# Patient Record
Sex: FEMALE | Race: BLACK OR AFRICAN AMERICAN | NOT HISPANIC OR LATINO | URBAN - METROPOLITAN AREA
[De-identification: names, ages, dates, MRNs, and addresses within clinical notes are randomized per-mention and may not be internally consistent; named-entity substitution may affect disease eponyms.]

---

## 2019-03-23 ENCOUNTER — EMERGENCY (EMERGENCY)
Facility: HOSPITAL | Age: 39
LOS: 1 days | Discharge: HOME | End: 2019-03-23
Attending: EMERGENCY MEDICINE | Admitting: EMERGENCY MEDICINE
Payer: MEDICAID

## 2019-03-23 VITALS
HEIGHT: 66 IN | TEMPERATURE: 98 F | HEART RATE: 72 BPM | WEIGHT: 214.95 LBS | DIASTOLIC BLOOD PRESSURE: 69 MMHG | RESPIRATION RATE: 15 BRPM | OXYGEN SATURATION: 97 % | SYSTOLIC BLOOD PRESSURE: 156 MMHG

## 2019-03-23 VITALS
RESPIRATION RATE: 15 BRPM | SYSTOLIC BLOOD PRESSURE: 132 MMHG | HEART RATE: 70 BPM | OXYGEN SATURATION: 99 % | DIASTOLIC BLOOD PRESSURE: 80 MMHG

## 2019-03-23 LAB
ALBUMIN SERPL ELPH-MCNC: 3.9 G/DL — SIGNIFICANT CHANGE UP (ref 3.3–5)
ALP SERPL-CCNC: 59 U/L — SIGNIFICANT CHANGE UP (ref 30–120)
ALT FLD-CCNC: 125 U/L DA — HIGH (ref 10–60)
ANION GAP SERPL CALC-SCNC: 10 MMOL/L — SIGNIFICANT CHANGE UP (ref 5–17)
AST SERPL-CCNC: 104 U/L — HIGH (ref 10–40)
BASOPHILS # BLD AUTO: 0.02 K/UL — SIGNIFICANT CHANGE UP (ref 0–0.2)
BASOPHILS NFR BLD AUTO: 0.5 % — SIGNIFICANT CHANGE UP (ref 0–2)
BILIRUB SERPL-MCNC: 0.6 MG/DL — SIGNIFICANT CHANGE UP (ref 0.2–1.2)
BUN SERPL-MCNC: 6 MG/DL — LOW (ref 7–23)
CALCIUM SERPL-MCNC: 8.7 MG/DL — SIGNIFICANT CHANGE UP (ref 8.4–10.5)
CHLORIDE SERPL-SCNC: 103 MMOL/L — SIGNIFICANT CHANGE UP (ref 96–108)
CK SERPL-CCNC: 145 U/L — SIGNIFICANT CHANGE UP (ref 26–192)
CO2 SERPL-SCNC: 28 MMOL/L — SIGNIFICANT CHANGE UP (ref 22–31)
CREAT SERPL-MCNC: 0.66 MG/DL — SIGNIFICANT CHANGE UP (ref 0.5–1.3)
EOSINOPHIL # BLD AUTO: 0.06 K/UL — SIGNIFICANT CHANGE UP (ref 0–0.5)
EOSINOPHIL NFR BLD AUTO: 1.6 % — SIGNIFICANT CHANGE UP (ref 0–6)
GLUCOSE SERPL-MCNC: 92 MG/DL — SIGNIFICANT CHANGE UP (ref 70–99)
HCT VFR BLD CALC: 38.4 % — SIGNIFICANT CHANGE UP (ref 34.5–45)
HGB BLD-MCNC: 13.5 G/DL — SIGNIFICANT CHANGE UP (ref 11.5–15.5)
IMM GRANULOCYTES NFR BLD AUTO: 0.3 % — SIGNIFICANT CHANGE UP (ref 0–1.5)
LYMPHOCYTES # BLD AUTO: 1.05 K/UL — SIGNIFICANT CHANGE UP (ref 1–3.3)
LYMPHOCYTES # BLD AUTO: 28.3 % — SIGNIFICANT CHANGE UP (ref 13–44)
MAGNESIUM SERPL-MCNC: 3.7 MG/DL — HIGH (ref 1.6–2.6)
MCHC RBC-ENTMCNC: 35.2 GM/DL — SIGNIFICANT CHANGE UP (ref 32–36)
MCHC RBC-ENTMCNC: 35.4 PG — HIGH (ref 27–34)
MCV RBC AUTO: 100.8 FL — HIGH (ref 80–100)
MONOCYTES # BLD AUTO: 0.25 K/UL — SIGNIFICANT CHANGE UP (ref 0–0.9)
MONOCYTES NFR BLD AUTO: 6.7 % — SIGNIFICANT CHANGE UP (ref 2–14)
NEUTROPHILS # BLD AUTO: 2.32 K/UL — SIGNIFICANT CHANGE UP (ref 1.8–7.4)
NEUTROPHILS NFR BLD AUTO: 62.6 % — SIGNIFICANT CHANGE UP (ref 43–77)
NRBC # BLD: 0 /100 WBCS — SIGNIFICANT CHANGE UP (ref 0–0)
PHOSPHATE SERPL-MCNC: 3.1 MG/DL — SIGNIFICANT CHANGE UP (ref 2.5–4.5)
PLATELET # BLD AUTO: 207 K/UL — SIGNIFICANT CHANGE UP (ref 150–400)
POTASSIUM SERPL-MCNC: 3 MMOL/L — LOW (ref 3.5–5.3)
POTASSIUM SERPL-SCNC: 3 MMOL/L — LOW (ref 3.5–5.3)
PROT SERPL-MCNC: 7.6 G/DL — SIGNIFICANT CHANGE UP (ref 6–8.3)
RBC # BLD: 3.81 M/UL — SIGNIFICANT CHANGE UP (ref 3.8–5.2)
RBC # FLD: 12.3 % — SIGNIFICANT CHANGE UP (ref 10.3–14.5)
SODIUM SERPL-SCNC: 141 MMOL/L — SIGNIFICANT CHANGE UP (ref 135–145)
TSH SERPL-MCNC: 1.01 UIU/ML — SIGNIFICANT CHANGE UP (ref 0.27–4.2)
WBC # BLD: 3.71 K/UL — LOW (ref 3.8–10.5)
WBC # FLD AUTO: 3.71 K/UL — LOW (ref 3.8–10.5)

## 2019-03-23 PROCEDURE — 99283 EMERGENCY DEPT VISIT LOW MDM: CPT

## 2019-03-23 PROCEDURE — 84100 ASSAY OF PHOSPHORUS: CPT

## 2019-03-23 PROCEDURE — 84443 ASSAY THYROID STIM HORMONE: CPT

## 2019-03-23 PROCEDURE — 82550 ASSAY OF CK (CPK): CPT

## 2019-03-23 PROCEDURE — 80053 COMPREHEN METABOLIC PANEL: CPT

## 2019-03-23 PROCEDURE — 36415 COLL VENOUS BLD VENIPUNCTURE: CPT

## 2019-03-23 PROCEDURE — 85027 COMPLETE CBC AUTOMATED: CPT

## 2019-03-23 PROCEDURE — 83735 ASSAY OF MAGNESIUM: CPT

## 2019-03-23 RX ORDER — SODIUM CHLORIDE 9 MG/ML
3 INJECTION INTRAMUSCULAR; INTRAVENOUS; SUBCUTANEOUS ONCE
Qty: 0 | Refills: 0 | Status: COMPLETED | OUTPATIENT
Start: 2019-03-23 | End: 2019-03-23

## 2019-03-23 RX ORDER — POTASSIUM CHLORIDE 20 MEQ
40 PACKET (EA) ORAL ONCE
Qty: 0 | Refills: 0 | Status: COMPLETED | OUTPATIENT
Start: 2019-03-23 | End: 2019-03-23

## 2019-03-23 RX ORDER — SODIUM CHLORIDE 9 MG/ML
1000 INJECTION INTRAMUSCULAR; INTRAVENOUS; SUBCUTANEOUS ONCE
Qty: 0 | Refills: 0 | Status: COMPLETED | OUTPATIENT
Start: 2019-03-23 | End: 2019-03-23

## 2019-03-23 RX ADMIN — SODIUM CHLORIDE 3 MILLILITER(S): 9 INJECTION INTRAMUSCULAR; INTRAVENOUS; SUBCUTANEOUS at 19:10

## 2019-03-23 RX ADMIN — Medication 40 MILLIEQUIVALENT(S): at 19:10

## 2019-03-23 RX ADMIN — SODIUM CHLORIDE 1000 MILLILITER(S): 9 INJECTION INTRAMUSCULAR; INTRAVENOUS; SUBCUTANEOUS at 19:40

## 2019-03-23 RX ADMIN — SODIUM CHLORIDE 1000 MILLILITER(S): 9 INJECTION INTRAMUSCULAR; INTRAVENOUS; SUBCUTANEOUS at 19:11

## 2019-03-23 NOTE — ED PROVIDER NOTE - PHYSICAL EXAMINATION
Neuro- A&Ox3. Speech clear, without articulation or word-finding difficulties. Eyes- PERRL bilaterally. EOMs in tact. No nystagmus. CN II-XII in tact. No facial droop. No sensory or motor deficits throughout extremities bilaterally. Strength 5/5 throughout upper and lower extremities. No pronator drift. Gait stable.   Spine Exam:     Cervical: No erythema, ecchymosis, or visible deformity. No midline tenderness or step-off appreciated on palpation. No paravertebral tenderness. No muscle spasm. FROM. NEG NEXUS criteria.          Thoracic: No erythema, ecchymosis, or visible deformity. No midline tenderness or step-off appreciated on palpation. No paravertebral tenderness. No muscle spasm.           Lumbosacral: No erythema, ecchymosis, or visible deformity. No midline tenderness or step-off appreciated on palpation. No paravertebral tenderness. No muscle spasm.

## 2019-03-23 NOTE — ED PROVIDER NOTE - OBJECTIVE STATEMENT
pt is a 38yo female with no significant pmhx c/o tingling in hands x today.pt reports she had episode of spasm In both hands with tingling from elbows to fingertips that lasted for 20 minutes. pt reports shortly after symptoms started she had dizziness that quickly resolved. pt is a 38yo female with no significant pmhx c/o tingling in hands x today.pt reports she had episode of spasm In both hands with tingling from elbows to fingertips that lasted for 20 minutes. pt reports shortly after symptoms started she had dizziness that quickly resolved. pt reports similar symptoms within the past few months. symptoms occur 2 times monthly, sometimes in hands sometimes in her head. pt reports she took a bath which improved symptoms. pt denies cp, sob, n/v, vision changes, loc.
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
## 2019-03-23 NOTE — ED ADULT NURSE NOTE - OBJECTIVE STATEMENT
patient has chronic numbness and right side of facial numbness, Pt is in no acute distress. pending md's eval.

## 2019-03-23 NOTE — ED PROVIDER NOTE - ATTENDING CONTRIBUTION TO CARE
I, Dr Granado, have personally performed a face to face diagnostic evaluation on this patient with the PA/NP. I have reviewed the PA/NP's note and agree with the history, Physical exam and plan of care, As per history pt is a 38yo female with no significant pmhx c/o tingling in hands x today.pt reports she had episode of spasm In both hands with tingling from elbows to fingertips that lasted for 20 minutes. pt reports shortly after symptoms started she had dizziness that quickly resolved. pt reports similar symptoms within the past few months. symptoms occur 2 times monthly, sometimes in hands sometimes in her head. pt reports she took a bath which improved symptoms. pt denies cp, sob, n/v, vision changes, loc. Lab study noted PE Unremarkable patient discharge home to fallow up with PMD.

## 2019-03-23 NOTE — ED PROVIDER NOTE - CARE PROVIDER_API CALL
Bob Burgos)  Neurology  [address] [place] [id number]  Phone: [phone]  Fax: [phone]  Follow Up Time: 1-3 Days

## 2019-03-23 NOTE — ED PROVIDER NOTE - PROGRESS NOTE DETAILS
pt improved. all labs reviewed. pt given po potassium. pt advised to follow up with pmd and neuro. All imaging and labs reviewed. all results reviewed with pt including abnormal results. pt given a copy of results. pt advised to follow up with pmd regarding abnormal results. All questions answered and concerns addressed. pt verbalized understanding and agreement with plan and dx. pt advised on next step and when/where to follow up. pt advised on all take home and otc medications. pt advised to follow up with PMD. pt advised to return to ed for worsenng symptoms including fever, cp, sob. will dc. pt improved. all labs reviewed. pt given po potassium. pt drinks etoh advised to refrain while left are elevated and follow up with pmd to repeat/eval.  pt advised to follow up with pmd and neuro. All imaging and labs reviewed. all results reviewed with pt including abnormal results. pt given a copy of results. pt advised to follow up with pmd regarding abnormal results. All questions answered and concerns addressed. pt verbalized understanding and agreement with plan and dx. pt advised on next step and when/where to follow up. pt advised on all take home and otc medications. pt advised to follow up with PMD. pt advised to return to ed for worsenng symptoms including fever, cp, sob. will dc.

## 2019-03-23 NOTE — ED PROVIDER NOTE - CHPI ED SYMPTOMS NEG
no blurred vision/no fever/no confusion/no numbness/no vomiting/no loss of consciousness/no nausea/no weakness/no change in level of consciousness

## 2019-06-11 ENCOUNTER — EMERGENCY (EMERGENCY)
Facility: HOSPITAL | Age: 39
LOS: 1 days | Discharge: HOME | End: 2019-06-11
Attending: EMERGENCY MEDICINE | Admitting: EMERGENCY MEDICINE
Payer: MEDICAID

## 2019-06-11 VITALS
DIASTOLIC BLOOD PRESSURE: 89 MMHG | HEART RATE: 68 BPM | SYSTOLIC BLOOD PRESSURE: 141 MMHG | OXYGEN SATURATION: 100 % | TEMPERATURE: 99 F | RESPIRATION RATE: 16 BRPM

## 2019-06-11 VITALS
SYSTOLIC BLOOD PRESSURE: 150 MMHG | RESPIRATION RATE: 17 BRPM | DIASTOLIC BLOOD PRESSURE: 98 MMHG | OXYGEN SATURATION: 98 % | HEART RATE: 68 BPM

## 2019-06-11 PROCEDURE — 99284 EMERGENCY DEPT VISIT MOD MDM: CPT

## 2019-06-11 PROCEDURE — 70486 CT MAXILLOFACIAL W/O DYE: CPT | Mod: 26

## 2019-06-11 PROCEDURE — 70450 CT HEAD/BRAIN W/O DYE: CPT | Mod: 26

## 2019-06-11 RX ORDER — ACETAMINOPHEN 500 MG
975 TABLET ORAL ONCE
Refills: 0 | Status: COMPLETED | OUTPATIENT
Start: 2019-06-11 | End: 2019-06-11

## 2019-06-11 RX ORDER — IBUPROFEN 200 MG
800 TABLET ORAL ONCE
Refills: 0 | Status: DISCONTINUED | OUTPATIENT
Start: 2019-06-11 | End: 2019-06-15

## 2019-06-11 RX ORDER — TRANEXAMIC ACID 100 MG/ML
5 INJECTION, SOLUTION INTRAVENOUS ONCE
Refills: 0 | Status: COMPLETED | OUTPATIENT
Start: 2019-06-11 | End: 2019-06-11

## 2019-06-11 RX ADMIN — TRANEXAMIC ACID 5 MILLILITER(S): 100 INJECTION, SOLUTION INTRAVENOUS at 14:28

## 2019-06-11 RX ADMIN — Medication 975 MILLIGRAM(S): at 14:27
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
## 2019-06-11 NOTE — ED PROVIDER NOTE - NSFOLLOWUPINSTRUCTIONS_ED_ALL_ED_FT
Follow up with the dental clinic within one week. Keep your mouth injury clean, avoid rubbing the sutures (stitches) to keep them from opening prematurely. Take Motrin 600-800mg every 6 hours as needed for pain and/or Swelling for the next 1-3 days. Take Tylenol 650mg every 4 hours as needed for pain during this time as well. You may apply ice to the affected area for 10 minutes at a time as tolerated.     Return to the ER promptly for any urgent concerns. \    [facility]:  ([phone]).
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
## 2019-06-11 NOTE — PROVIDER CONTACT NOTE (OTHER) - RECOMMENDATIONS
[facility] caller ID# [id number], call time 15:51pm, spoke with Sarahi GABRIEL at [facility]

## 2019-06-11 NOTE — ED PROVIDER NOTE - NS ED ROS FT
CONSTITUTIONAL: No fevers, no chills  Eyes: no visual changes  Ears: no ear drainage, no ear pain  Nose: no nasal congestion  Mouth/Throat: lip laceration   Cardiovascular: No Chest pain  Respiratory: No SOB  Gastrointestinal: No n/v/d, no abd pain  Genitourinary: no dysuria, no hematuria  SKIN: no rashes.  NEURO: no headache  PSYCHIATRIC: no known mental health issues.

## 2019-06-11 NOTE — ED PROVIDER NOTE - PHYSICAL EXAMINATION
GEN - NAD; well appearing; A+O x3   HEAD - nc/at  EYES - EOMI, no conjunctival pallor, no scleral icterus  ENT -   upper lip with oozing abrasion/lacation, r. central incisor TTP, significant lip swelling      NECK - Neck supple  PULM - CTA b/l,  symmetric breath sounds  COR -  RRR, S1 S2, no murmurs  ABD - , ND, NT, soft, no guarding, no rebound, no masses    BACK - no CVA tenderness, nontender spine     EXTREMS - no edema, no deformity, warm and well perfused    SKIN - no rash or bruising      NEUROLOGIC - alert, sensation nl, motor 5/5 RUE/LUE/RLE/LLE
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
## 2019-06-11 NOTE — ED PROVIDER NOTE - NSFOLLOWUPCLINICS_GEN_ALL_ED_FT
[facility]  Dental  [address] [place] [id number]  Phone: [phone]  Fax:   Follow Up Time:
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
## 2019-12-20 ENCOUNTER — EMERGENCY (EMERGENCY)
Facility: HOSPITAL | Age: 39
LOS: 1 days | Discharge: HOME | End: 2019-12-20
Attending: EMERGENCY MEDICINE | Admitting: EMERGENCY MEDICINE
Payer: MEDICAID

## 2019-12-20 VITALS
DIASTOLIC BLOOD PRESSURE: 104 MMHG | SYSTOLIC BLOOD PRESSURE: 158 MMHG | TEMPERATURE: 99 F | RESPIRATION RATE: 17 BRPM | OXYGEN SATURATION: 97 % | HEART RATE: 93 BPM

## 2019-12-20 PROCEDURE — 99285 EMERGENCY DEPT VISIT HI MDM: CPT

## 2019-12-20 RX ORDER — KETOROLAC TROMETHAMINE 30 MG/ML
15 SYRINGE (ML) INJECTION ONCE
Refills: 0 | Status: DISCONTINUED | OUTPATIENT
Start: 2019-12-20 | End: 2019-12-20

## 2019-12-20 RX ORDER — MORPHINE SULFATE 50 MG/1
6 CAPSULE, EXTENDED RELEASE ORAL ONCE
Refills: 0 | Status: DISCONTINUED | OUTPATIENT
Start: 2019-12-20 | End: 2019-12-20

## 2019-12-20 RX ORDER — MORPHINE SULFATE 50 MG/1
4 CAPSULE, EXTENDED RELEASE ORAL ONCE
Refills: 0 | Status: DISCONTINUED | OUTPATIENT
Start: 2019-12-20 | End: 2019-12-20

## 2019-12-20 RX ORDER — PENICILLIN V POTASSIUM 250 MG
500 TABLET ORAL ONCE
Refills: 0 | Status: COMPLETED | OUTPATIENT
Start: 2019-12-20 | End: 2019-12-20

## 2019-12-20 RX ORDER — PENICILLIN V POTASSIUM 250 MG
1 TABLET ORAL
Qty: 29 | Refills: 0
Start: 2019-12-20

## 2019-12-20 RX ADMIN — MORPHINE SULFATE 4 MILLIGRAM(S): 50 CAPSULE, EXTENDED RELEASE ORAL at 09:07

## 2019-12-20 RX ADMIN — MORPHINE SULFATE 4 MILLIGRAM(S): 50 CAPSULE, EXTENDED RELEASE ORAL at 08:52

## 2019-12-20 RX ADMIN — Medication 15 MILLIGRAM(S): at 10:03

## 2019-12-20 RX ADMIN — Medication 500 MILLIGRAM(S): at 08:52

## 2019-12-20 NOTE — ED PROVIDER NOTE - PATIENT PORTAL LINK FT
You can access the FollowMyHealth Patient Portal offered by [facility] by registering at the following website: [email or web address]. By joining [employer]’s FollowMyHealth portal, you will also be able to view your health information using other applications (apps) compatible with our system.

## 2019-12-20 NOTE — ED ADULT NURSE NOTE - NSIMPLEMENTINTERV_GEN_ALL_ED
Patient has not been feeling well for 3 days. Their symptoms are rash on groin.   Anita Francis LPN LPN....................  9/29/2019   2:53 PM      
Implemented All Universal Safety Interventions:  [place] to call system. Call bell, personal items and telephone within reach. Instruct patient to call for assistance. Room bathroom lighting operational. Non-slip footwear when patient is off stretcher. Physically safe environment: no spills, clutter or unnecessary equipment. Stretcher in lowest position, wheels locked, appropriate side rails in place.

## 2019-12-20 NOTE — ED PROVIDER NOTE - OBJECTIVE STATEMENT
38 y/o F presents to ED c/o left upper dental pain. Pt notes the pain has been there for years, and over the last 2 days it got worse. Pt has pain when eating. Pt is aware of cracked molar on top, but hasn't gotten it fixed yet. Pt is tolerating PO, and fluids. Pt denies fever.      PMH: Bronchitis    Heart murmur    UTI (urinary tract infection)    PSH:  delivery delivered    FH/SH: non-contributory, except as noted in the HPI  Allergies: No known drug allergies  Immunizations: Up-to-date  Medications: No chronic home medications

## 2019-12-20 NOTE — ED ADULT TRIAGE NOTE - CHIEF COMPLAINT QUOTE
alert c/o left side dental pain  states wisdom tooth is cracked  has been taking motrin but its no longer working

## 2019-12-30 ENCOUNTER — EMERGENCY (EMERGENCY)
Facility: HOSPITAL | Age: 39
LOS: 1 days | Discharge: LEFT BEFORE DISCHARGE | End: 2019-12-30
Admitting: EMERGENCY MEDICINE

## 2019-12-30 VITALS
OXYGEN SATURATION: 100 % | HEART RATE: 74 BPM | RESPIRATION RATE: 18 BRPM | DIASTOLIC BLOOD PRESSURE: 92 MMHG | SYSTOLIC BLOOD PRESSURE: 142 MMHG | TEMPERATURE: 98 F

## 2019-12-30 NOTE — ED ADULT NURSE NOTE - CHIEF COMPLAINT QUOTE
Pt. c/o "inflamed wisdom tooth" on left upper side, seen here last week, prescribed codeine and penicillin c/o persistent pain to area. Reports taking 400 mg of motrin around 8 pm. Denies fever, chills.

## 2020-10-21 ENCOUNTER — EMERGENCY (EMERGENCY)
Facility: HOSPITAL | Age: 40
LOS: 1 days | Discharge: HOME | End: 2020-10-21
Attending: EMERGENCY MEDICINE | Admitting: EMERGENCY MEDICINE
Payer: MEDICAID

## 2020-10-21 VITALS
HEIGHT: 66 IN | RESPIRATION RATE: 18 BRPM | HEART RATE: 88 BPM | TEMPERATURE: 98 F | SYSTOLIC BLOOD PRESSURE: 153 MMHG | OXYGEN SATURATION: 100 % | DIASTOLIC BLOOD PRESSURE: 102 MMHG

## 2020-10-21 VITALS
RESPIRATION RATE: 16 BRPM | HEART RATE: 84 BPM | OXYGEN SATURATION: 100 % | DIASTOLIC BLOOD PRESSURE: 93 MMHG | SYSTOLIC BLOOD PRESSURE: 144 MMHG

## 2020-10-21 LAB
ALBUMIN SERPL ELPH-MCNC: 4.8 G/DL — SIGNIFICANT CHANGE UP (ref 3.3–5)
ALP SERPL-CCNC: 62 U/L — SIGNIFICANT CHANGE UP (ref 40–120)
ALT FLD-CCNC: 158 U/L — HIGH (ref 4–33)
ANION GAP SERPL CALC-SCNC: 17 MMO/L — HIGH (ref 7–14)
ANISOCYTOSIS BLD QL: SLIGHT — SIGNIFICANT CHANGE UP
APPEARANCE UR: CLEAR — SIGNIFICANT CHANGE UP
AST SERPL-CCNC: 209 U/L — HIGH (ref 4–32)
BACTERIA # UR AUTO: NEGATIVE — SIGNIFICANT CHANGE UP
BASOPHILS # BLD AUTO: 0.01 K/UL — SIGNIFICANT CHANGE UP (ref 0–0.2)
BASOPHILS NFR BLD AUTO: 0.2 % — SIGNIFICANT CHANGE UP (ref 0–2)
BASOPHILS NFR SPEC: 0 % — SIGNIFICANT CHANGE UP (ref 0–2)
BILIRUB SERPL-MCNC: 1 MG/DL — SIGNIFICANT CHANGE UP (ref 0.2–1.2)
BILIRUB UR-MCNC: NEGATIVE — SIGNIFICANT CHANGE UP
BLOOD UR QL VISUAL: NEGATIVE — SIGNIFICANT CHANGE UP
BUN SERPL-MCNC: 6 MG/DL — LOW (ref 7–23)
CALCIUM SERPL-MCNC: 8.9 MG/DL — SIGNIFICANT CHANGE UP (ref 8.4–10.5)
CHLORIDE SERPL-SCNC: 101 MMOL/L — SIGNIFICANT CHANGE UP (ref 98–107)
CO2 SERPL-SCNC: 25 MMOL/L — SIGNIFICANT CHANGE UP (ref 22–31)
COLOR SPEC: YELLOW — SIGNIFICANT CHANGE UP
CREAT SERPL-MCNC: 0.53 MG/DL — SIGNIFICANT CHANGE UP (ref 0.5–1.3)
EOSINOPHIL # BLD AUTO: 0.02 K/UL — SIGNIFICANT CHANGE UP (ref 0–0.5)
EOSINOPHIL NFR BLD AUTO: 0.4 % — SIGNIFICANT CHANGE UP (ref 0–6)
EOSINOPHIL NFR FLD: 0 % — SIGNIFICANT CHANGE UP (ref 0–6)
GLUCOSE SERPL-MCNC: 88 MG/DL — SIGNIFICANT CHANGE UP (ref 70–99)
GLUCOSE UR-MCNC: NEGATIVE — SIGNIFICANT CHANGE UP
HCT VFR BLD CALC: 40 % — SIGNIFICANT CHANGE UP (ref 34.5–45)
HGB BLD-MCNC: 13.4 G/DL — SIGNIFICANT CHANGE UP (ref 11.5–15.5)
HYALINE CASTS # UR AUTO: SIGNIFICANT CHANGE UP
IMM GRANULOCYTES NFR BLD AUTO: 0.2 % — SIGNIFICANT CHANGE UP (ref 0–1.5)
KETONES UR-MCNC: HIGH
LEUKOCYTE ESTERASE UR-ACNC: NEGATIVE — SIGNIFICANT CHANGE UP
LIDOCAIN IGE QN: 41.2 U/L — SIGNIFICANT CHANGE UP (ref 7–60)
LYMPHOCYTES # BLD AUTO: 0.89 K/UL — LOW (ref 1–3.3)
LYMPHOCYTES # BLD AUTO: 18.7 % — SIGNIFICANT CHANGE UP (ref 13–44)
LYMPHOCYTES NFR SPEC AUTO: 19.5 % — SIGNIFICANT CHANGE UP (ref 13–44)
MACROCYTES BLD QL: SLIGHT — SIGNIFICANT CHANGE UP
MAGNESIUM SERPL-MCNC: 1.7 MG/DL — SIGNIFICANT CHANGE UP (ref 1.6–2.6)
MCHC RBC-ENTMCNC: 33.5 % — SIGNIFICANT CHANGE UP (ref 32–36)
MCHC RBC-ENTMCNC: 35.3 PG — HIGH (ref 27–34)
MCV RBC AUTO: 105.3 FL — HIGH (ref 80–100)
MONOCYTES # BLD AUTO: 0.23 K/UL — SIGNIFICANT CHANGE UP (ref 0–0.9)
MONOCYTES NFR BLD AUTO: 4.8 % — SIGNIFICANT CHANGE UP (ref 2–14)
MONOCYTES NFR BLD: 3.4 % — SIGNIFICANT CHANGE UP (ref 2–9)
NEUTROPHIL AB SER-ACNC: 74.6 % — SIGNIFICANT CHANGE UP (ref 43–77)
NEUTROPHILS # BLD AUTO: 3.59 K/UL — SIGNIFICANT CHANGE UP (ref 1.8–7.4)
NEUTROPHILS NFR BLD AUTO: 75.7 % — SIGNIFICANT CHANGE UP (ref 43–77)
NITRITE UR-MCNC: NEGATIVE — SIGNIFICANT CHANGE UP
NRBC # FLD: 0 K/UL — SIGNIFICANT CHANGE UP (ref 0–0)
PH UR: 7.5 — SIGNIFICANT CHANGE UP (ref 5–8)
PHOSPHATE SERPL-MCNC: 2.6 MG/DL — SIGNIFICANT CHANGE UP (ref 2.5–4.5)
PLATELET # BLD AUTO: 194 K/UL — SIGNIFICANT CHANGE UP (ref 150–400)
PLATELET COUNT - ESTIMATE: NORMAL — SIGNIFICANT CHANGE UP
PMV BLD: 9.8 FL — SIGNIFICANT CHANGE UP (ref 7–13)
POLYCHROMASIA BLD QL SMEAR: SLIGHT — SIGNIFICANT CHANGE UP
POTASSIUM SERPL-MCNC: 3.2 MMOL/L — LOW (ref 3.5–5.3)
POTASSIUM SERPL-SCNC: 3.2 MMOL/L — LOW (ref 3.5–5.3)
PROT SERPL-MCNC: 7.8 G/DL — SIGNIFICANT CHANGE UP (ref 6–8.3)
PROT UR-MCNC: 30 — SIGNIFICANT CHANGE UP
RBC # BLD: 3.8 M/UL — SIGNIFICANT CHANGE UP (ref 3.8–5.2)
RBC # FLD: 12.7 % — SIGNIFICANT CHANGE UP (ref 10.3–14.5)
RBC CASTS # UR COMP ASSIST: SIGNIFICANT CHANGE UP (ref 0–?)
SODIUM SERPL-SCNC: 143 MMOL/L — SIGNIFICANT CHANGE UP (ref 135–145)
SP GR SPEC: 1.03 — SIGNIFICANT CHANGE UP (ref 1–1.04)
SQUAMOUS # UR AUTO: SIGNIFICANT CHANGE UP
UROBILINOGEN FLD QL: SIGNIFICANT CHANGE UP
VARIANT LYMPHS # BLD: 2.5 % — SIGNIFICANT CHANGE UP
WBC # BLD: 4.75 K/UL — SIGNIFICANT CHANGE UP (ref 3.8–10.5)
WBC # FLD AUTO: 4.75 K/UL — SIGNIFICANT CHANGE UP (ref 3.8–10.5)
WBC UR QL: SIGNIFICANT CHANGE UP (ref 0–?)

## 2020-10-21 PROCEDURE — 99285 EMERGENCY DEPT VISIT HI MDM: CPT

## 2020-10-21 RX ORDER — POTASSIUM CHLORIDE 20 MEQ
40 PACKET (EA) ORAL ONCE
Refills: 0 | Status: COMPLETED | OUTPATIENT
Start: 2020-10-21 | End: 2020-10-21

## 2020-10-21 RX ORDER — ONDANSETRON 8 MG/1
4 TABLET, FILM COATED ORAL ONCE
Refills: 0 | Status: COMPLETED | OUTPATIENT
Start: 2020-10-21 | End: 2020-10-21

## 2020-10-21 RX ORDER — ONDANSETRON 8 MG/1
1 TABLET, FILM COATED ORAL
Qty: 9 | Refills: 0
Start: 2020-10-21

## 2020-10-21 RX ORDER — FAMOTIDINE 10 MG/ML
20 INJECTION INTRAVENOUS ONCE
Refills: 0 | Status: COMPLETED | OUTPATIENT
Start: 2020-10-21 | End: 2020-10-21

## 2020-10-21 RX ORDER — SODIUM CHLORIDE 9 MG/ML
1000 INJECTION INTRAMUSCULAR; INTRAVENOUS; SUBCUTANEOUS ONCE
Refills: 0 | Status: COMPLETED | OUTPATIENT
Start: 2020-10-21 | End: 2020-10-21

## 2020-10-21 RX ADMIN — SODIUM CHLORIDE 1000 MILLILITER(S): 9 INJECTION INTRAMUSCULAR; INTRAVENOUS; SUBCUTANEOUS at 14:00

## 2020-10-21 RX ADMIN — Medication 1 MILLIGRAM(S): at 16:11

## 2020-10-21 RX ADMIN — Medication 30 MILLILITER(S): at 14:00

## 2020-10-21 RX ADMIN — Medication 40 MILLIEQUIVALENT(S): at 15:30

## 2020-10-21 RX ADMIN — ONDANSETRON 4 MILLIGRAM(S): 8 TABLET, FILM COATED ORAL at 15:57

## 2020-10-21 RX ADMIN — FAMOTIDINE 20 MILLIGRAM(S): 10 INJECTION INTRAVENOUS at 14:01

## 2020-10-21 RX ADMIN — ONDANSETRON 4 MILLIGRAM(S): 8 TABLET, FILM COATED ORAL at 14:01

## 2020-10-21 NOTE — ED ADULT NURSE NOTE - NSIMPLEMENTINTERV_GEN_ALL_ED
Implemented All Universal Safety Interventions:  [place] to call system. Call bell, personal items and telephone within reach. Instruct patient to call for assistance. Room bathroom lighting operational. Non-slip footwear when patient is off stretcher. Physically safe environment: no spills, clutter or unnecessary equipment. Stretcher in lowest position, wheels locked, appropriate side rails in place.

## 2020-10-21 NOTE — ED ADULT TRIAGE NOTE - CHIEF COMPLAINT QUOTE
Arrives from home states that she has been experiencing full body tremors since last night, states she vomited once this morning but states "this happens sometimes". Denies any PMH

## 2020-10-21 NOTE — ED PROVIDER NOTE - PROGRESS NOTE DETAILS
Francisco Gilmore, PGY-1- Patient reevaluated multiple times. Abd exam has remained benign. No signs of surgical pathology. Patient with elevated LFTs but completely nontender RUQ. Patient improved with 1 mg Ativan. Discussed need to f/u with PCP given labs and symptoms. Patient agrees with plan. Strict return precautions given. All questions answered

## 2020-10-21 NOTE — ED PROVIDER NOTE - PATIENT PORTAL LINK FT
You can access the FollowMyHealth Patient Portal offered by [facility] by registering at the following website: [email or web address]. By joining [employer]’s FollowMyHealth portal, you will also be able to view your health information using other applications (apps) compatible with our system.

## 2020-10-21 NOTE — ED PROVIDER NOTE - CLINICAL SUMMARY MEDICAL DECISION MAKING FREE TEXT BOX
40 year old female with pmhx of HTN, presenting with nausea, vomiting, followed by LUQ pain and dizziness. Will hydrate patient, treat nausea, obtain basic labs, UA. Abd exam benign, vitals stable. Will reeval

## 2020-10-21 NOTE — ED ADULT NURSE NOTE - OBJECTIVE STATEMENT
Break coverage RN- received Pt in intake room 8. pt A&OX3, ambulatory. Pt with history of HTN. Pt coming into ED from home c/o full body tremors since last night. pt also c/o vomiting x 1 this morning with some abdominal discomfort. Pt reports these symptoms intermittently for the last few months. Pt appears slightly tremulous at this time, but otherwise appears stable and in no apparent distress. respirations are equal and nonlabored, no respiratory distress noted. Pt denies any other medical complaints at this time. denies chest pain, sob, cough, fever, headache. 20 gauge iv placed in the right ac , labs sent. pt medicated as per orders. awaiting further plan

## 2020-10-21 NOTE — ED PROVIDER NOTE - NS ED ROS FT
General: no fever, no chills  Eyes: no vision changes, no eye pain  Cardiovascular: no chest pain, no edema  Respiratory: no cough, no shortness of breath  Gastrointestinal: +abd pain, nausea, and vomiting. no diarrhea  Genitourinary: no dysuria, no hematuria  Musculoskeletal: no muscle pain, no joint pain  Skin: no rash, no lesions  Neuro: no numbness, no tingling, +dizziness, tremors   Psych: no depression, no anxiety

## 2020-10-21 NOTE — ED PROVIDER NOTE - NSFOLLOWUPINSTRUCTIONS_ED_ALL_ED_FT
1) Follow up with your doctor within 1-3 days of being seen in the emergency department  2) Return to the ED immediately for new or worsening symptoms severe pain, pain not improved with Zofran, unable to eat or drink, blood in vomit, blood in stool, feeling lightheaded, dizzy   3) Please continue to take any home medications as prescribed  4) Your test results from your ED visit were discussed with you prior to discharge  5) You were provided with a copy of your test results  6) A prescription was sent to your pharmacy for Zofran. You can take 1 tablet up to 3 times a day as needed for nausea and vomiting. Follow pharmacy instructions.

## 2020-10-21 NOTE — ED PROVIDER NOTE - PHYSICAL EXAMINATION
General: AOx3  Skin: normal color for race, no rash  Head: normocephalic, atraumatic  Eyes: clear conjunctiva, EOMI  ENMT: airway patent, no nasal discharge, oral mucosa dry   Cardiovascular: normal rate, normal rhythm, S1/S2  Pulmonary: clear to auscultation bilaterally, no rales, rhonchi, or wheeze  Abdomen: soft, nontender, negative Arora's sign, no peritoneal signs, no guarding   Musculoskeletal: moving extremities well, no deformity  Psych: normal mood, normal affect
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
## 2020-10-23 LAB
CULTURE RESULTS: SIGNIFICANT CHANGE UP
SPECIMEN SOURCE: SIGNIFICANT CHANGE UP

## 2021-07-27 ENCOUNTER — TRANSCRIPTION ENCOUNTER (OUTPATIENT)
Age: 41
End: 2021-07-27

## 2022-07-13 ENCOUNTER — NON-APPOINTMENT (OUTPATIENT)
Age: 42
End: 2022-07-13

## 2022-07-14 ENCOUNTER — EMERGENCY (EMERGENCY)
Facility: HOSPITAL | Age: 42
LOS: 1 days | Discharge: HOME | End: 2022-07-14
Attending: EMERGENCY MEDICINE
Payer: MEDICAID

## 2022-07-14 VITALS
OXYGEN SATURATION: 100 % | HEART RATE: 86 BPM | DIASTOLIC BLOOD PRESSURE: 98 MMHG | SYSTOLIC BLOOD PRESSURE: 150 MMHG | WEIGHT: 214.95 LBS | HEIGHT: 66 IN | TEMPERATURE: 98 F | RESPIRATION RATE: 14 BRPM

## 2022-07-14 VITALS
HEART RATE: 72 BPM | DIASTOLIC BLOOD PRESSURE: 97 MMHG | SYSTOLIC BLOOD PRESSURE: 161 MMHG | RESPIRATION RATE: 18 BRPM | OXYGEN SATURATION: 100 %

## 2022-07-14 PROCEDURE — 76377 3D RENDER W/INTRP POSTPROCES: CPT | Mod: 26

## 2022-07-14 PROCEDURE — 99285 EMERGENCY DEPT VISIT HI MDM: CPT

## 2022-07-14 PROCEDURE — 70450 CT HEAD/BRAIN W/O DYE: CPT | Mod: 26,MA

## 2022-07-14 PROCEDURE — 70486 CT MAXILLOFACIAL W/O DYE: CPT | Mod: 26,MA

## 2022-07-14 RX ORDER — ACETAMINOPHEN 500 MG
650 TABLET ORAL ONCE
Refills: 0 | Status: COMPLETED | OUTPATIENT
Start: 2022-07-14 | End: 2022-07-14

## 2022-07-14 RX ADMIN — Medication 650 MILLIGRAM(S): at 12:06

## 2022-07-14 NOTE — DISCHARGE NOTE NURSING/CASE MANAGEMENT/SOCIAL WORK - PATIENT PORTAL LINK FT
You can access the FollowMyHealth Patient Portal offered by [facility] by registering at the following website: [email or web address]. By joining [employer]’s FollowMyHealth portal, you will also be able to view your health information using other applications (apps) compatible with our system.

## 2022-07-14 NOTE — ED PROVIDER NOTE - PHYSICAL EXAMINATION
GENERAL: Awake, alert, NAD  HEENT: NC/AT, moist mucous membranes, TM intact b/l no blood in canal, Tenderness along zygomatic arch R side, no blood on palate of mouth, no jaw deviation   ABDOMEN: Soft, , non tender, non distended, no rebound, no guarding  BACK: No midline spinal tenderness, no CVA tenderness  EXT: No edema, no calf tenderness, 2+ DP pulses bilaterally, no deformities.  NEURO: A&Ox3. Moving all extremities.  SKIN: Warm and dry. No rash.  PSYCH: Normal affect.
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
## 2022-07-14 NOTE — ED PROVIDER NOTE - PROGRESS NOTE DETAILS
Madi PGY 2 pt cleared by social work, given out pt resources currently pending CT scan Adriano: Patient ct scan results pending. has to leave to  children from bus.  will dc/ home and call with results. Madi PGY 2 pt informed by call of results referred to plastic surg clinic

## 2022-07-14 NOTE — ED PROVIDER NOTE - PATIENT PORTAL LINK FT
You can access the FollowMyHealth Patient Portal offered by [facility] by registering at the following website: [email or web address]. By joining [employer]’s FollowMyHealth portal, you will also be able to view your health information using other applications (apps) compatible with our system.
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
## 2022-08-31 NOTE — ED ADULT TRIAGE NOTE - MODE OF ARRIVAL
Walk in [employer] PAST MEDICAL HISTORY:  A-fib     HLD (hyperlipidemia)     HTN (hypertension)

## 2022-10-11 ENCOUNTER — APPOINTMENT (OUTPATIENT)
Dept: INTERNAL MEDICINE | Facility: CLINIC | Age: 42
End: 2022-10-11

## 2022-12-21 ENCOUNTER — NON-APPOINTMENT (OUTPATIENT)
Age: 42
End: 2022-12-21

## 2023-03-08 ENCOUNTER — APPOINTMENT (OUTPATIENT)
Dept: INTERNAL MEDICINE | Facility: CLINIC | Age: 43
End: 2023-03-08
Payer: MEDICAID

## 2023-03-08 VITALS
HEIGHT: 66 IN | HEART RATE: 109 BPM | DIASTOLIC BLOOD PRESSURE: 70 MMHG | TEMPERATURE: 98 F | WEIGHT: 163 LBS | OXYGEN SATURATION: 98 % | BODY MASS INDEX: 26.2 KG/M2 | SYSTOLIC BLOOD PRESSURE: 110 MMHG

## 2023-03-08 DIAGNOSIS — Z12.11 ENCOUNTER FOR SCREENING FOR MALIGNANT NEOPLASM OF COLON: ICD-10-CM

## 2023-03-08 DIAGNOSIS — Z78.9 OTHER SPECIFIED HEALTH STATUS: ICD-10-CM

## 2023-03-08 PROCEDURE — G0444 DEPRESSION SCREEN ANNUAL: CPT | Mod: 59

## 2023-03-08 PROCEDURE — 99205 OFFICE O/P NEW HI 60 MIN: CPT | Mod: 25

## 2023-03-08 PROCEDURE — 99496 TRANSJ CARE MGMT HIGH F2F 7D: CPT

## 2023-03-08 NOTE — ASSESSMENT
[FreeTextEntry1] : ACUTE LIVER FAILURE\par WAS DUE TO acetaminophen toxicity, PT HAD TOOTH ACHE AND WAS TAKING acetaminophen FOR IT \par - REF TO DENTIST FOR TOOTH ACHE\par -GASTRO REF\par - NO ALCOHOL USE, NO acetaminophen\par - DISSCUSSED PREVENTIVE MEASURE AS SHE HAS AN increased risk of infection and sepsis\par H/O MG COLON CA AND 45 YRS\par - COLONOSCOPY\par Preventive medicine discussed - including importance of lifestyle modification - \par with incorporation of healthy diet,  recommended Diet low fat diet\par discussed balanced diet and intermittent fasting\par Preventive medicine discussed - including importance of lifestyle modification - with incorporation of healthy diet + regular exercise \par -Follow up in 3/2023 year for annual exam or PRN.\par All questions and concerns were discussed. \par 67 MINUTES SPENT BY THE PROVIDER, INCLUSIVE OF ALL ISSUES RELATED TO THIS ENCOUNTER ON THE DATE OF SERVICE.

## 2023-03-08 NOTE — HEALTH RISK ASSESSMENT
[Yes] : Yes [1 or 2 (0 pts)] : 1 or 2 (0 points) [Never (0 pts)] : Never (0 points) [No falls in past year] : Patient reported no falls in the past year [0] : 2) Feeling down, depressed, or hopeless: Not at all (0) [PHQ-2 Negative - No further assessment needed] : PHQ-2 Negative - No further assessment needed [I have developed a follow-up plan documented below in the note.] : I have developed a follow-up plan documented below in the note. [Former] : Former [5-9] : 5-9 [> 15 Years] : > 15 Years [RRB2Gcojs] : 0

## 2023-03-08 NOTE — HISTORY OF PRESENT ILLNESS
[FreeTextEntry8] : HERE FOR H/F \par ADMITTED TO [place] ELEVATED LFT, LIVER FAILURE 2/26/2023\par D/C 3/2/2023 - LFT GETTING BETTER

## 2023-03-08 NOTE — PAST MEDICAL HISTORY
[Menstruating] : hx of menstruating [Definite ___ (Date)] : the last menstrual period was [unfilled] [Regular Cycle Intervals] : have been regular [Total Preg ___] : G: [unfilled] [Live Births___] : P: [unfilled] [Full Term ___] : Full Term: [unfilled]  [Abortions ___] : Abortions: [unfilled] [Living ___] : Living: [unfilled]

## 2023-03-22 ENCOUNTER — NON-APPOINTMENT (OUTPATIENT)
Age: 43
End: 2023-03-22

## 2023-03-22 ENCOUNTER — APPOINTMENT (OUTPATIENT)
Dept: INTERNAL MEDICINE | Facility: CLINIC | Age: 43
End: 2023-03-22
Payer: MEDICAID

## 2023-03-22 VITALS
SYSTOLIC BLOOD PRESSURE: 120 MMHG | DIASTOLIC BLOOD PRESSURE: 80 MMHG | HEART RATE: 85 BPM | BODY MASS INDEX: 25.6 KG/M2 | TEMPERATURE: 99 F | WEIGHT: 189 LBS | OXYGEN SATURATION: 98 % | HEIGHT: 72 IN

## 2023-03-22 DIAGNOSIS — Z00.00 ENCOUNTER FOR GENERAL ADULT MEDICAL EXAMINATION W/OUT ABNORMAL FINDINGS: ICD-10-CM

## 2023-03-22 DIAGNOSIS — R92.2 INCONCLUSIVE MAMMOGRAM: ICD-10-CM

## 2023-03-22 DIAGNOSIS — K72.00 ACUTE AND SUBACUTE HEPATIC FAILURE W/OUT COMA: ICD-10-CM

## 2023-03-22 DIAGNOSIS — E55.9 VITAMIN D DEFICIENCY, UNSPECIFIED: ICD-10-CM

## 2023-03-22 DIAGNOSIS — E66.9 OBESITY, UNSPECIFIED: ICD-10-CM

## 2023-03-22 PROCEDURE — G0444 DEPRESSION SCREEN ANNUAL: CPT | Mod: 59

## 2023-03-22 PROCEDURE — 99396 PREV VISIT EST AGE 40-64: CPT | Mod: 25

## 2023-03-22 PROCEDURE — G0447 BEHAVIOR COUNSEL OBESITY 15M: CPT

## 2023-03-22 PROCEDURE — 36415 COLL VENOUS BLD VENIPUNCTURE: CPT

## 2023-03-22 PROCEDURE — 93000 ELECTROCARDIOGRAM COMPLETE: CPT | Mod: 59

## 2023-03-22 NOTE — HEALTH RISK ASSESSMENT
[Good] : ~his/her~  mood as  good [No] : No [0] : 2) Feeling down, depressed, or hopeless: Not at all (0) [PHQ-2 Negative - No further assessment needed] : PHQ-2 Negative - No further assessment needed [I have developed a follow-up plan documented below in the note.] : I have developed a follow-up plan documented below in the note. [HIV test declined] : HIV test declined [Hepatitis C test declined] : Hepatitis C test declined [No falls in past year] : Patient reported no falls in the past year [EAC0Athpk] : 0 [PapSmearDate] : 03/2023

## 2023-03-22 NOTE — COUNSELING
[Potential consequences of obesity discussed] : Potential consequences of obesity discussed [Benefits of weight loss discussed] : Benefits of weight loss discussed [Encouraged to use exercise tracking device] : Encouraged to use exercise tracking device [Target Wt Loss Goal ___] : Weight Loss Goals: Target weight loss goal [unfilled] lbs [Weigh Self Weekly] : weigh self weekly [Decrease Portions] : decrease portions [____ min/wk Activity] : [unfilled] min/wk activity [Keep Food Diary] : keep food diary [FreeTextEntry4] : 16

## 2023-03-22 NOTE — HISTORY OF PRESENT ILLNESS
[FreeTextEntry1] : HERE FOR Comprehensive annual examination  [de-identified] : ELEVATED LFT, LIVER FAILURE 2/26/2023 f/u LIVER TRANSPLANT  DR OCASIO STABLE\par

## 2023-03-22 NOTE — ASSESSMENT
[FreeTextEntry1] : # ACUTE LIVER FAILURE\par F/U LIVER TRANSPLANT\par \par # Weight management\par weight loss counseling provided as above \par discussed balanced diet\par Preventive medicine discussed - including importance of lifestyle modification - with incorporation of healthy diet + regular exercise, intermittent fasting \par \par *Immunizations \par COVID -19 TOTAL 0  VACCINE \par Influenza   declines\par Pneumococcal - N/A \par TDAP - UTD\par shingles vaccine N/A\par EKG done for HCM at this office today;  NSR no acute ST-T changes\par Lab ( venipuncture ) done today at this office\par Preventive medicine discussed - including importance of lifestyle modification - \par with incorporation of healthy diet,  recommended Diet low fat diet\par + regular exercise\par con't calcium 1200-1500mg and vit D [id number] IU daily and regular weight bearing exercise\par for OP prevention\par weight loss counseling provided as above \par Depression screening WITH the Patient Health Questionnaire-2 (PHQ-2), THE RESULT WAS NEGATIVE.\par The benefits of adequate calcium intake and routine daily cardiovascular exercise were reviewed with the patient.\par  The patient was informed regarding the benefits of a YEARLY screening FOR SKIN CANCER \par -Recommended following up with dermatology for annual skin surveillance.\par -Recommended following up with GYN for annual surveillance.\par Recommended following up with dental, opthalmology\par  The importance of safer-sex was discussed with the patient. \par DISCUSSED PRECAUTIONS AGAINST COVID19, INCLUDING MASK WEARING, SOCIAL DISTANCING AND HAND WASHING.\par -Follow up in 1 year for CPE / annual exam or PRN.\par All questions and concerns were discussed.

## 2023-03-23 ENCOUNTER — NON-APPOINTMENT (OUTPATIENT)
Age: 43
End: 2023-03-23

## 2023-03-23 DIAGNOSIS — R79.89 OTHER SPECIFIED ABNORMAL FINDINGS OF BLOOD CHEMISTRY: ICD-10-CM

## 2023-03-23 LAB
25(OH)D3 SERPL-MCNC: 9.1 NG/ML
ALBUMIN SERPL ELPH-MCNC: 4.4 G/DL
ALP BLD-CCNC: 100 U/L
ALT SERPL-CCNC: 45 U/L
ANION GAP SERPL CALC-SCNC: 14 MMOL/L
AST SERPL-CCNC: 80 U/L
BASOPHILS # BLD AUTO: 0 K/UL
BASOPHILS NFR BLD AUTO: 0 %
BILIRUB SERPL-MCNC: 0.6 MG/DL
BUN SERPL-MCNC: 4 MG/DL
CALCIUM SERPL-MCNC: 9.8 MG/DL
CHLORIDE SERPL-SCNC: 104 MMOL/L
CHOLEST SERPL-MCNC: 245 MG/DL
CO2 SERPL-SCNC: 22 MMOL/L
CREAT SERPL-MCNC: 0.54 MG/DL
EGFR: 116 ML/MIN/1.73M2
EOSINOPHIL # BLD AUTO: 0.03 K/UL
EOSINOPHIL NFR BLD AUTO: 0.9 %
ESTIMATED AVERAGE GLUCOSE: 91 MG/DL
FERRITIN SERPL-MCNC: 451 NG/ML
GLUCOSE SERPL-MCNC: 88 MG/DL
HBA1C MFR BLD HPLC: 4.8 %
HCT VFR BLD CALC: 36.5 %
HDLC SERPL-MCNC: 53 MG/DL
HGB BLD-MCNC: 12 G/DL
IMM GRANULOCYTES NFR BLD AUTO: 0.3 %
LDLC SERPL CALC-MCNC: 169 MG/DL
LYMPHOCYTES # BLD AUTO: 1.32 K/UL
LYMPHOCYTES NFR BLD AUTO: 38.7 %
MAN DIFF?: NORMAL
MCHC RBC-ENTMCNC: 32.9 GM/DL
MCHC RBC-ENTMCNC: 34.1 PG
MCV RBC AUTO: 103.7 FL
MONOCYTES # BLD AUTO: 0.25 K/UL
MONOCYTES NFR BLD AUTO: 7.3 %
NEUTROPHILS # BLD AUTO: 1.8 K/UL
NEUTROPHILS NFR BLD AUTO: 52.8 %
NONHDLC SERPL-MCNC: 192 MG/DL
PLATELET # BLD AUTO: 170 K/UL
POTASSIUM SERPL-SCNC: 3.5 MMOL/L
PROT SERPL-MCNC: 8.3 G/DL
RBC # BLD: 3.52 M/UL
RBC # FLD: 12.9 %
SODIUM SERPL-SCNC: 140 MMOL/L
TRIGL SERPL-MCNC: 118 MG/DL
TSH SERPL-ACNC: 0.81 UIU/ML
VIT B12 SERPL-MCNC: 589 PG/ML
WBC # FLD AUTO: 3.41 K/UL

## 2023-04-18 ENCOUNTER — LABORATORY RESULT (OUTPATIENT)
Age: 43
End: 2023-04-18

## 2023-04-18 ENCOUNTER — APPOINTMENT (OUTPATIENT)
Dept: GASTROENTEROLOGY | Facility: CLINIC | Age: 43
End: 2023-04-18
Payer: MEDICAID

## 2023-04-18 LAB — HCG UR QL: NEGATIVE

## 2023-04-18 PROCEDURE — 45380 COLONOSCOPY AND BIOPSY: CPT | Mod: 33

## 2023-04-18 PROCEDURE — 81025 URINE PREGNANCY TEST: CPT

## 2023-04-18 RX ORDER — POLYETHYLENE GLYCOL 3350 AND ELECTROLYTES WITH LEMON FLAVOR 236; 22.74; 6.74; 5.86; 2.97 G/4L; G/4L; G/4L; G/4L; G/4L
236 POWDER, FOR SOLUTION ORAL
Qty: 1 | Refills: 0 | Status: DISCONTINUED | COMMUNITY
Start: 2023-04-18 | End: 2023-04-18

## 2023-04-18 RX ORDER — POLYETHYLENE GLYCOL 3350 17 G/17G
17 POWDER, FOR SOLUTION ORAL DAILY
Qty: 1 | Refills: 3 | Status: ACTIVE | COMMUNITY
Start: 2023-04-18 | End: 1900-01-01

## 2023-04-18 RX ORDER — POLYETHYLENE GLYCOL 3350 17 G/17G
17 POWDER, FOR SOLUTION ORAL DAILY
Qty: 30 | Refills: 3 | Status: ACTIVE | COMMUNITY
Start: 2023-04-18 | End: 1900-01-01

## 2023-05-22 RX ORDER — SODIUM PICOSULFATE, MAGNESIUM OXIDE, AND ANHYDROUS CITRIC ACID 10; 3.5; 12 MG/160ML; G/160ML; G/160ML
10-3.5-12 MG-GM LIQUID ORAL
Qty: 1 | Refills: 0 | Status: ACTIVE | COMMUNITY
Start: 2023-04-18 | End: 1900-01-01

## 2023-05-23 ENCOUNTER — APPOINTMENT (OUTPATIENT)
Dept: GASTROENTEROLOGY | Facility: CLINIC | Age: 43
End: 2023-05-23
Payer: MEDICAID

## 2023-05-23 LAB — HCG UR QL: NEGATIVE

## 2023-05-23 PROCEDURE — 81025 URINE PREGNANCY TEST: CPT

## 2023-05-23 PROCEDURE — 45378 DIAGNOSTIC COLONOSCOPY: CPT

## 2023-07-06 NOTE — DISCHARGE NOTE NURSING/CASE MANAGEMENT/SOCIAL WORK - HISTORY OF COVID-19 VACCINATION
No Detail Level: Detailed Quality 226: Preventive Care And Screening: Tobacco Use: Screening And Cessation Intervention: Patient screened for tobacco use and is an ex/non-smoker Quality 130: Documentation Of Current Medications In The Medical Record: Current Medications Documented